# Patient Record
Sex: FEMALE | Race: WHITE
[De-identification: names, ages, dates, MRNs, and addresses within clinical notes are randomized per-mention and may not be internally consistent; named-entity substitution may affect disease eponyms.]

---

## 2017-12-31 ENCOUNTER — HOSPITAL ENCOUNTER (EMERGENCY)
Dept: HOSPITAL 92 - ERS | Age: 67
Discharge: HOME | End: 2017-12-31
Payer: MEDICARE

## 2017-12-31 DIAGNOSIS — J06.9: Primary | ICD-10-CM

## 2017-12-31 DIAGNOSIS — Z79.899: ICD-10-CM

## 2017-12-31 DIAGNOSIS — Z79.4: ICD-10-CM

## 2017-12-31 DIAGNOSIS — E11.9: ICD-10-CM

## 2017-12-31 PROCEDURE — 71020: CPT

## 2017-12-31 NOTE — RAD
PA AND LATERAL VIEWS CHEST:

 

History 

Cough.

 

FINDINGS: 

The heart size is normal.  No focal areas of consolidation, pneumothorax, or pleural effusion are see
n.  There are degenerative changes in the spine.

 

IMPRESSION: 

No radiographic evidence of acute cardiopulmonary process.

 

POS: [facility]

## 2019-08-13 ENCOUNTER — HOSPITAL ENCOUNTER (EMERGENCY)
Dept: HOSPITAL 92 - ERS | Age: 69
Discharge: HOME | End: 2019-08-13
Payer: MEDICARE

## 2019-08-13 DIAGNOSIS — E11.9: ICD-10-CM

## 2019-08-13 DIAGNOSIS — S01.511A: Primary | ICD-10-CM
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
## 2019-08-13 PROCEDURE — 90471 IMMUNIZATION ADMIN: CPT

## 2019-08-13 PROCEDURE — 90715 TDAP VACCINE 7 YRS/> IM: CPT

## 2019-08-13 PROCEDURE — 40650 RPR LIP FTH VERMILION ONLY: CPT
